# Patient Record
Sex: FEMALE | URBAN - METROPOLITAN AREA
[De-identification: names, ages, dates, MRNs, and addresses within clinical notes are randomized per-mention and may not be internally consistent; named-entity substitution may affect disease eponyms.]

---

## 2021-09-14 ENCOUNTER — APPOINTMENT (RX ONLY)
Dept: URBAN - METROPOLITAN AREA CLINIC 68 | Facility: CLINIC | Age: 66
Setting detail: DERMATOLOGY
End: 2021-09-14

## 2021-09-14 DIAGNOSIS — Z41.9 ENCOUNTER FOR PROCEDURE FOR PURPOSES OTHER THAN REMEDYING HEALTH STATE, UNSPECIFIED: ICD-10-CM

## 2021-09-14 PROCEDURE — ? BOTOX

## 2021-09-14 PROCEDURE — ? COSMETIC CONSULTATION: GENERAL

## 2021-09-14 PROCEDURE — ? COSMETIC CONSULTATION: BOTOX

## 2021-09-14 NOTE — PROCEDURE: BOTOX
Left Periorbital Skin Units: 0
Show Periorbital Units: Yes
Show Right And Left Pupillary Line Units: No
Show Inventory Tab: Hide
Detail Level: Detailed
Post-Care Instructions: Patient instructed to not lie down for 2 hours and limit physical activity for 2 hours.
Dilution (U/0.1 Cc): 4
Consent: Written consent obtained. Risks include but not limited to lid/brow ptosis, bruising, swelling, diplopia, temporary effect, incomplete chemical denervation.

## 2021-09-20 ENCOUNTER — APPOINTMENT (RX ONLY)
Dept: URBAN - METROPOLITAN AREA CLINIC 68 | Facility: CLINIC | Age: 66
Setting detail: DERMATOLOGY
End: 2021-09-20

## 2021-09-20 DIAGNOSIS — Z41.9 ENCOUNTER FOR PROCEDURE FOR PURPOSES OTHER THAN REMEDYING HEALTH STATE, UNSPECIFIED: ICD-10-CM

## 2021-09-20 PROCEDURE — ? RF MICRONEEDLING

## 2021-09-20 PROCEDURE — ? PRO-NOX

## 2021-09-20 NOTE — PROCEDURE: RF MICRONEEDLING
Topical Anesthesia Type: 23% lidocaine, 7% tetracaine
Depth In Mm: 0.1
Laser: Endymed Intensif
Treatment Number (Optional): 0
Laser Override (Optional): [id number]
Post-Care Instructions: After the procedure, take precautions agains sun exposure. Do not apply sunscreen for 12 hours after the procedure. Do not apply make-up for 12 hours after the procedure. Avoid alcohol based toners for 10-14 days. After 2-3 days patients can return to their regular skin regimen.
Information: You must select either a Location or Location Override for the laser information to render in the note
Indication: dyschromia
Detail Level: Zone
Indication: moderate to severe perioral rhytides
Pre-Procedure Text: The treatment areas were cleansed in the usual fashion and treatment proceeded as outlined above.
Location #1: Upper Lip
Pulse Width In Msec (Optional): Please see photo attachment for settings
Consent: Written consent obtained, risks reviewed including but not limited to pain, scarring, infection and incomplete improvement.  Patient understands the procedure is cosmetic in nature and will require out of pocket payment.
Anesthesia Type: 1% lidocaine with 1:100,000 epinephrine

## 2021-09-20 NOTE — PROCEDURE: PRO-NOX
Post-Care Instructions: The patient was instructed to call the office if they had any lasting side effects or concerns.
Detail Level: Zone
Consent: Written consent obtained, risks reviewed including but not limited to euphoria, light-headedness, nausea, vomiting and dizziness.
Pre-Procedure Text: The patient was connected to the Pro-Nox machine via mask and positioned appropriately for the anticipated procedure.  Following the procedure they were disconnected and monitored for any lasting side effects prior to leaving the office.

## 2021-09-27 ENCOUNTER — APPOINTMENT (RX ONLY)
Dept: URBAN - METROPOLITAN AREA CLINIC 68 | Facility: CLINIC | Age: 66
Setting detail: DERMATOLOGY
End: 2021-09-27

## 2021-09-27 DIAGNOSIS — Z41.9 ENCOUNTER FOR PROCEDURE FOR PURPOSES OTHER THAN REMEDYING HEALTH STATE, UNSPECIFIED: ICD-10-CM

## 2021-09-27 PROCEDURE — ? PDO THREAD LIFT

## 2021-09-27 NOTE — PROCEDURE: PDO THREAD LIFT
Price (Use Numbers Only, No Special Characters Or $): 0
Procedure Text: An 18 gauge needle was used to create the insertions points and the NovaThAstros needles with absorbable sutures were inserted subcutaneously in each direction and advanced through to the exit points on either side. The threads were then tightened and cut adjacent to the exit points and allowed to retract into the subcutaneous space.
Post-Care Instructions (For The Patient Hand-Out): I reviewed with the patient in detail post-care instructions. Patient should apply ice packs multiple times a day for a couple days. They were instructed to call if they have any problems.
Thread Size(S) (Optional): see diagram
Consent: The risks and benefits of the procedure were discussed with the patient.  Specifically the risks of swelling, bruising, bleeding, discomfort, infection, damage to nerves, numbness, allergic reactions, pigment changes, partial correction, rippling or dimpling, asymmetry, redness, bumps or nodules, visibility of threads, and scarring were reviewed. After this, consent was obtained.
Anesthesia Type: 1% lidocaine with epinephrine
Number Of Threads: 1
Detail Level: Zone
Pre-Procedure Text: The treatment areas were cleaned with alcohol and chlorhexidine. Insertion and exit points were mapped out with the Silhouette ruler and marked with a surgical marker.
Postcare Statement Text: There were no complications and the patient was given postcare instructions and ice packs.
Anesthesia Method: local infiltration

## 2021-10-05 ENCOUNTER — APPOINTMENT (RX ONLY)
Dept: URBAN - METROPOLITAN AREA CLINIC 68 | Facility: CLINIC | Age: 66
Setting detail: DERMATOLOGY
End: 2021-10-05

## 2021-10-18 ENCOUNTER — APPOINTMENT (RX ONLY)
Dept: URBAN - METROPOLITAN AREA CLINIC 68 | Facility: CLINIC | Age: 66
Setting detail: DERMATOLOGY
End: 2021-10-18

## 2021-10-18 ENCOUNTER — RX ONLY (OUTPATIENT)
Age: 66
Setting detail: RX ONLY
End: 2021-10-18

## 2021-10-18 RX ORDER — CEPHALEXIN 500 MG/1
CAPSULE ORAL
Qty: 14 | Refills: 0 | Status: ERX | COMMUNITY
Start: 2021-10-18

## 2022-04-23 ENCOUNTER — APPOINTMENT (RX ONLY)
Dept: URBAN - METROPOLITAN AREA CLINIC 68 | Facility: CLINIC | Age: 67
Setting detail: DERMATOLOGY
End: 2022-04-23

## 2022-04-23 DIAGNOSIS — Z41.9 ENCOUNTER FOR PROCEDURE FOR PURPOSES OTHER THAN REMEDYING HEALTH STATE, UNSPECIFIED: ICD-10-CM

## 2022-04-23 PROCEDURE — ? BOTOX

## 2022-04-23 NOTE — PROCEDURE: BOTOX
Glabellar Complex Units: 0
Show Mentalis Units: No
Show Additional Area 5: Yes
Consent: Written consent obtained. Risks include but not limited to lid/brow ptosis, bruising, swelling, diplopia, temporary effect, incomplete chemical denervation.
Dilution (U/0.1 Cc): 4
Post-Care Instructions: Patient instructed to not lie down for 4 hours and limit physical activity for 24 hours.
Detail Level: Detailed
Show Inventory Tab: Hide

## 2022-07-12 ENCOUNTER — APPOINTMENT (RX ONLY)
Dept: URBAN - METROPOLITAN AREA CLINIC 68 | Facility: CLINIC | Age: 67
Setting detail: DERMATOLOGY
End: 2022-07-12

## 2022-07-12 DIAGNOSIS — Z41.9 ENCOUNTER FOR PROCEDURE FOR PURPOSES OTHER THAN REMEDYING HEALTH STATE, UNSPECIFIED: ICD-10-CM

## 2022-07-12 PROCEDURE — ? BOTOX

## 2022-07-12 NOTE — PROCEDURE: BOTOX
Show Additional Area 3: Yes
Left Periorbital Skin Units: 0
Dilution (U/0.1 Cc): 4
Show Right And Left Periorbital Units: No
Detail Level: Detailed
Consent: Written consent obtained. Risks include but not limited to lid/brow ptosis, bruising, swelling, diplopia, temporary effect, incomplete chemical denervation.
Post-Care Instructions: Patient instructed to not lie down for 4 hours and limit physical activity for 24 hours.
Show Inventory Tab: Hide

## 2022-12-12 ENCOUNTER — APPOINTMENT (RX ONLY)
Dept: URBAN - METROPOLITAN AREA CLINIC 68 | Facility: CLINIC | Age: 67
Setting detail: DERMATOLOGY
End: 2022-12-12

## 2022-12-12 DIAGNOSIS — Z41.9 ENCOUNTER FOR PROCEDURE FOR PURPOSES OTHER THAN REMEDYING HEALTH STATE, UNSPECIFIED: ICD-10-CM

## 2022-12-12 PROCEDURE — ? BOTOX

## 2022-12-12 NOTE — PROCEDURE: BOTOX
Show Orbicularis Oculi Units: Yes
Left Pupillary Line Units: 0
Dilution (U/0.1 Cc): 4
Consent: Written consent obtained. Risks include but not limited to lid/brow ptosis, bruising, swelling, diplopia, temporary effect, incomplete chemical denervation.
Show Right And Left Brow Units: No
Show Inventory Tab: Hide
Detail Level: Detailed
Post-Care Instructions: Patient instructed to not lie down for 4 hours and limit physical activity for 24 hours.